# Patient Record
Sex: FEMALE | Race: OTHER | NOT HISPANIC OR LATINO | URBAN - METROPOLITAN AREA
[De-identification: names, ages, dates, MRNs, and addresses within clinical notes are randomized per-mention and may not be internally consistent; named-entity substitution may affect disease eponyms.]

---

## 2017-02-18 ENCOUNTER — EMERGENCY (EMERGENCY)
Facility: HOSPITAL | Age: 57
LOS: 1 days | Discharge: HOME | End: 2017-02-18
Attending: EMERGENCY MEDICINE | Admitting: EMERGENCY MEDICINE
Payer: MEDICAID

## 2017-02-18 VITALS
DIASTOLIC BLOOD PRESSURE: 69 MMHG | OXYGEN SATURATION: 100 % | HEART RATE: 67 BPM | RESPIRATION RATE: 18 BRPM | SYSTOLIC BLOOD PRESSURE: 154 MMHG | TEMPERATURE: 98 F

## 2017-02-18 LAB
ALBUMIN SERPL ELPH-MCNC: 4.1 G/DL — SIGNIFICANT CHANGE UP (ref 3.3–5)
ALP SERPL-CCNC: 94 U/L — SIGNIFICANT CHANGE UP (ref 40–120)
ALT FLD-CCNC: 40 U/L — HIGH (ref 4–33)
APPEARANCE UR: CLEAR — SIGNIFICANT CHANGE UP
AST SERPL-CCNC: 35 U/L — HIGH (ref 4–32)
BASE EXCESS BLDV CALC-SCNC: 0.3 MMOL/L — SIGNIFICANT CHANGE UP
BASOPHILS # BLD AUTO: 0.03 K/UL — SIGNIFICANT CHANGE UP (ref 0–0.2)
BASOPHILS NFR BLD AUTO: 0.5 % — SIGNIFICANT CHANGE UP (ref 0–2)
BILIRUB SERPL-MCNC: 0.2 MG/DL — SIGNIFICANT CHANGE UP (ref 0.2–1.2)
BILIRUB UR-MCNC: NEGATIVE — SIGNIFICANT CHANGE UP
BLOOD GAS VENOUS - CREATININE: 1.3 MG/DL — SIGNIFICANT CHANGE UP (ref 0.5–1.3)
BLOOD UR QL VISUAL: NEGATIVE — SIGNIFICANT CHANGE UP
BUN SERPL-MCNC: 17 MG/DL — SIGNIFICANT CHANGE UP (ref 7–23)
CALCIUM SERPL-MCNC: 8.8 MG/DL — SIGNIFICANT CHANGE UP (ref 8.4–10.5)
CHLORIDE BLDV-SCNC: 103 MMOL/L — SIGNIFICANT CHANGE UP (ref 96–108)
CHLORIDE SERPL-SCNC: 98 MMOL/L — SIGNIFICANT CHANGE UP (ref 98–107)
CO2 SERPL-SCNC: 23 MMOL/L — SIGNIFICANT CHANGE UP (ref 22–31)
COLOR SPEC: SIGNIFICANT CHANGE UP
CREAT SERPL-MCNC: 1.28 MG/DL — SIGNIFICANT CHANGE UP (ref 0.5–1.3)
EOSINOPHIL # BLD AUTO: 0.06 K/UL — SIGNIFICANT CHANGE UP (ref 0–0.5)
EOSINOPHIL NFR BLD AUTO: 0.9 % — SIGNIFICANT CHANGE UP (ref 0–6)
GAS PNL BLDV: 131 MMOL/L — LOW (ref 136–146)
GLUCOSE BLDV-MCNC: 115 — HIGH (ref 70–99)
GLUCOSE SERPL-MCNC: 127 MG/DL — HIGH (ref 70–99)
GLUCOSE UR-MCNC: 250 — SIGNIFICANT CHANGE UP
HCO3 BLDV-SCNC: 23 MMOL/L — SIGNIFICANT CHANGE UP (ref 20–27)
HCT VFR BLD CALC: 29.2 % — LOW (ref 34.5–45)
HCT VFR BLDV CALC: 32.6 % — LOW (ref 34.5–45)
HGB BLD-MCNC: 9.9 G/DL — LOW (ref 11.5–15.5)
HGB BLDV-MCNC: 10.6 G/DL — LOW (ref 11.5–15.5)
IMM GRANULOCYTES NFR BLD AUTO: 0.5 % — SIGNIFICANT CHANGE UP (ref 0–1.5)
KETONES UR-MCNC: NEGATIVE — SIGNIFICANT CHANGE UP
LACTATE BLDV-MCNC: 2.4 MMOL/L — HIGH (ref 0.5–2)
LEUKOCYTE ESTERASE UR-ACNC: NEGATIVE — SIGNIFICANT CHANGE UP
LYMPHOCYTES # BLD AUTO: 0.96 K/UL — LOW (ref 1–3.3)
LYMPHOCYTES # BLD AUTO: 14.9 % — SIGNIFICANT CHANGE UP (ref 13–44)
MCHC RBC-ENTMCNC: 30.3 PG — SIGNIFICANT CHANGE UP (ref 27–34)
MCHC RBC-ENTMCNC: 33.9 % — SIGNIFICANT CHANGE UP (ref 32–36)
MCV RBC AUTO: 89.3 FL — SIGNIFICANT CHANGE UP (ref 80–100)
MONOCYTES # BLD AUTO: 0.46 K/UL — SIGNIFICANT CHANGE UP (ref 0–0.9)
MONOCYTES NFR BLD AUTO: 7.2 % — SIGNIFICANT CHANGE UP (ref 2–14)
MUCOUS THREADS # UR AUTO: SIGNIFICANT CHANGE UP
NEUTROPHILS # BLD AUTO: 4.89 K/UL — SIGNIFICANT CHANGE UP (ref 1.8–7.4)
NEUTROPHILS NFR BLD AUTO: 76 % — SIGNIFICANT CHANGE UP (ref 43–77)
NITRITE UR-MCNC: NEGATIVE — SIGNIFICANT CHANGE UP
PCO2 BLDV: 50 MMHG — SIGNIFICANT CHANGE UP (ref 41–51)
PH BLDV: 7.33 PH — SIGNIFICANT CHANGE UP (ref 7.32–7.43)
PH UR: 6 — SIGNIFICANT CHANGE UP (ref 4.6–8)
PLATELET # BLD AUTO: 173 K/UL — SIGNIFICANT CHANGE UP (ref 150–400)
PMV BLD: 9.9 FL — SIGNIFICANT CHANGE UP (ref 7–13)
PO2 BLDV: 27 MMHG — LOW (ref 35–40)
POTASSIUM BLDV-SCNC: 3.7 MMOL/L — SIGNIFICANT CHANGE UP (ref 3.4–4.5)
POTASSIUM SERPL-MCNC: 4.1 MMOL/L — SIGNIFICANT CHANGE UP (ref 3.5–5.3)
POTASSIUM SERPL-SCNC: 4.1 MMOL/L — SIGNIFICANT CHANGE UP (ref 3.5–5.3)
PROT SERPL-MCNC: 7.5 G/DL — SIGNIFICANT CHANGE UP (ref 6–8.3)
PROT UR-MCNC: 10 — SIGNIFICANT CHANGE UP
RBC # BLD: 3.27 M/UL — LOW (ref 3.8–5.2)
RBC # FLD: 13.3 % — SIGNIFICANT CHANGE UP (ref 10.3–14.5)
RBC CASTS # UR COMP ASSIST: SIGNIFICANT CHANGE UP (ref 0–?)
SAO2 % BLDV: 45.2 % — LOW (ref 60–85)
SODIUM SERPL-SCNC: 137 MMOL/L — SIGNIFICANT CHANGE UP (ref 135–145)
SP GR SPEC: 1.01 — SIGNIFICANT CHANGE UP (ref 1–1.03)
SQUAMOUS # UR AUTO: SIGNIFICANT CHANGE UP
TSH SERPL-MCNC: 0.06 UIU/ML — LOW (ref 0.27–4.2)
UROBILINOGEN FLD QL: NORMAL E.U. — SIGNIFICANT CHANGE UP (ref 0.1–0.2)
WBC # BLD: 6.43 K/UL — SIGNIFICANT CHANGE UP (ref 3.8–10.5)
WBC # FLD AUTO: 6.43 K/UL — SIGNIFICANT CHANGE UP (ref 3.8–10.5)
WBC UR QL: SIGNIFICANT CHANGE UP (ref 0–?)

## 2017-02-18 PROCEDURE — 71020: CPT | Mod: 26

## 2017-02-18 PROCEDURE — 99220: CPT

## 2017-02-18 RX ORDER — FAMOTIDINE 10 MG/ML
20 INJECTION INTRAVENOUS DAILY
Qty: 0 | Refills: 0 | Status: DISCONTINUED | OUTPATIENT
Start: 2017-02-18 | End: 2017-02-22

## 2017-02-18 RX ORDER — SERTRALINE 25 MG/1
25 TABLET, FILM COATED ORAL DAILY
Qty: 0 | Refills: 0 | Status: DISCONTINUED | OUTPATIENT
Start: 2017-02-18 | End: 2017-02-22

## 2017-02-18 RX ORDER — SIMVASTATIN 20 MG/1
20 TABLET, FILM COATED ORAL AT BEDTIME
Qty: 0 | Refills: 0 | Status: DISCONTINUED | OUTPATIENT
Start: 2017-02-18 | End: 2017-02-22

## 2017-02-18 RX ORDER — ATENOLOL 25 MG/1
50 TABLET ORAL DAILY
Qty: 0 | Refills: 0 | Status: DISCONTINUED | OUTPATIENT
Start: 2017-02-18 | End: 2017-02-22

## 2017-02-18 RX ORDER — LOSARTAN POTASSIUM 100 MG/1
100 TABLET, FILM COATED ORAL DAILY
Qty: 0 | Refills: 0 | Status: DISCONTINUED | OUTPATIENT
Start: 2017-02-18 | End: 2017-02-22

## 2017-02-18 RX ORDER — AMLODIPINE BESYLATE 2.5 MG/1
10 TABLET ORAL DAILY
Qty: 0 | Refills: 0 | Status: DISCONTINUED | OUTPATIENT
Start: 2017-02-18 | End: 2017-02-22

## 2017-02-18 RX ORDER — METHYLDOPA 250 MG
250 TABLET ORAL DAILY
Qty: 0 | Refills: 0 | Status: DISCONTINUED | OUTPATIENT
Start: 2017-02-18 | End: 2017-02-22

## 2017-02-18 RX ADMIN — SIMVASTATIN 20 MILLIGRAM(S): 20 TABLET, FILM COATED ORAL at 23:38

## 2017-02-18 NOTE — ED ADULT NURSE NOTE - OBJECTIVE STATEMENT
pt is a 56 yr old female BIBA from home with low blood sugar, see triage note. PMH- CVA, DM. pt minimally verbal, speaking on Slovak to nephew when prompted, as per family pt's baseline. pt ambulatory, has family given meds at home, able to feed self. pt A & O x 3, at baseline. pt showing no s/s of acute distress. PIV placed, labs done, food given will trend FS. Will TREVON Brown RN

## 2017-02-18 NOTE — ED PROVIDER NOTE - OBJECTIVE STATEMENT
56F w/ pmh of dm, stroke ( no residual weakness, but a little confused since stroke) pw/ hypoglyemica and shivering during lunch; tried giving po to increase sugar without much relief; denies pain; cough/cold/diarrhea/dysuria./ chest pain/ sob    meds: insulin - unsure names -  and herself administered it 56F w/ pmh of dm, stroke ( no residual weakness, but a little confused since stroke) pw/ hypoglyemica and shivering during lunch; tried giving po to increase sugar without much relief; denies pain; cough/cold/diarrhea/dysuria./ chest pain/ sob    meds: insulin - unsure names -  and herself administered it    Attendinyo female with diabetes presents with feeling shaking while having lunch.  Glucose was 26.  pt was diaphoretic.  Pt was shaky.  Given juice and EMS called.  Now glucose is still 85.  Pt more awake but feels a little shaky.  Has had insulin adjusted because of hyperglycemia in recent weeks.

## 2017-02-18 NOTE — ED PROVIDER NOTE - PROGRESS NOTE DETAILS
JIMMY Velazquez - PCP was called and left a message regarding patient's visit and to follow up with PCP regarding diabetes medication regime.

## 2017-02-18 NOTE — ED ADULT TRIAGE NOTE - CHIEF COMPLAINT QUOTE
Pt c/o episode of AMS accompanied by "shaking" as per family. Family states FS of 26 at time and given food, upon EMS arrival . Pt has PMH CVA, diabetes. Pt given juice in triage.

## 2017-02-19 VITALS
SYSTOLIC BLOOD PRESSURE: 169 MMHG | HEART RATE: 73 BPM | DIASTOLIC BLOOD PRESSURE: 75 MMHG | OXYGEN SATURATION: 99 % | RESPIRATION RATE: 14 BRPM | TEMPERATURE: 98 F

## 2017-02-19 LAB
BASE EXCESS BLDV CALC-SCNC: 3.5 MMOL/L — SIGNIFICANT CHANGE UP
BLOOD GAS VENOUS - CREATININE: 1.17 MG/DL — SIGNIFICANT CHANGE UP (ref 0.5–1.3)
CHLORIDE BLDV-SCNC: 105 MMOL/L — SIGNIFICANT CHANGE UP (ref 96–108)
GAS PNL BLDV: 135 MMOL/L — LOW (ref 136–146)
GLUCOSE BLDV-MCNC: 145 — HIGH (ref 70–99)
HCO3 BLDV-SCNC: 27 MMOL/L — SIGNIFICANT CHANGE UP (ref 20–27)
HCT VFR BLDV CALC: 28.9 % — LOW (ref 34.5–45)
HGB BLDV-MCNC: 9.3 G/DL — LOW (ref 11.5–15.5)
LACTATE BLDV-MCNC: 1.6 MMOL/L — SIGNIFICANT CHANGE UP (ref 0.5–2)
PCO2 BLDV: 47 MMHG — SIGNIFICANT CHANGE UP (ref 41–51)
PH BLDV: 7.39 PH — SIGNIFICANT CHANGE UP (ref 7.32–7.43)
PO2 BLDV: 44 MMHG — HIGH (ref 35–40)
POTASSIUM BLDV-SCNC: 4.1 MMOL/L — SIGNIFICANT CHANGE UP (ref 3.4–4.5)
SAO2 % BLDV: 79.1 % — SIGNIFICANT CHANGE UP (ref 60–85)
T3 SERPL-MCNC: 110 NG/DL — SIGNIFICANT CHANGE UP (ref 80–200)
T4 AB SER-ACNC: 7.64 UG/DL — SIGNIFICANT CHANGE UP (ref 5.1–13)

## 2017-02-19 PROCEDURE — 99217: CPT

## 2017-02-19 RX ADMIN — AMLODIPINE BESYLATE 10 MILLIGRAM(S): 2.5 TABLET ORAL at 07:17

## 2017-02-19 RX ADMIN — FAMOTIDINE 20 MILLIGRAM(S): 10 INJECTION INTRAVENOUS at 09:51

## 2017-02-19 RX ADMIN — ATENOLOL 50 MILLIGRAM(S): 25 TABLET ORAL at 07:18

## 2017-02-19 RX ADMIN — LOSARTAN POTASSIUM 100 MILLIGRAM(S): 100 TABLET, FILM COATED ORAL at 09:52

## 2017-02-19 RX ADMIN — SERTRALINE 25 MILLIGRAM(S): 25 TABLET, FILM COATED ORAL at 09:56

## 2017-02-19 RX ADMIN — Medication 100 MILLIGRAM(S): at 09:56

## 2017-02-19 RX ADMIN — Medication 250 MILLIGRAM(S): at 09:52

## 2017-02-19 NOTE — ED CDU PROVIDER NOTE - PROGRESS NOTE DETAILS
Sylvie att progress note: 55 yo woman presenting with episode of hypoglycemia, low TSH.  No e/o thyroid storm.  No e/o infection causing this.  Initial evaluating team documented patient is on a long acting insulin, no record of such medication.  Patient will need diabetes education.  Plan to decrease novolog dose.  Pending [facility]  to determine exact meds. No response from Endocrinology after 3 pages. Pt. stable - blood sugar has been stable since CDU admission - discussed with patient about insulin (pt usually is on 25 Units bid and changes dose depending on blood sugar) - advised to not slide/change doses and stay on 25 units bid until follow up with PMD this week. [facility] information as well. Will leave [id number] [facility] for patient follow up. Sylvie att discharge note: 55 yo woman presenting with episode of hypoglycemia, low TSH.  No e/o thyroid storm.  No e/o infection causing this.  Initial evaluating team documented patient is on a long acting insulin, no record of such medication.  Medication education provided and adjusted as above.  Endocrine clinic follow up.  Patient informed of ED visit findings, understands plan.  Patient provided with written and further verbal instructions not included in discharge paperwork.  Patient instructed to follow up with their primary care physician in 2-3 days and return for new, worsened, or persistent symptoms.

## 2017-02-19 NOTE — ED CDU PROVIDER NOTE - MEDICAL DECISION MAKING DETAILS
Pt is a 57 y/o F nonsmoker PMHx HTN, DM type 2, HLD, GERD, CVA (1/2016) p/w hypoglycemia yesterday -- Endocrine consult

## 2017-02-19 NOTE — ED CDU PROVIDER NOTE - OBJECTIVE STATEMENT
Pt is a 57 y/o F nonsmoker PMHx HTN, DM type 2, HLD, GERD, CVA (1/2016) p/w hypoglycemia yesterday.  Pt speaks Bangla and electing to have nephew translate.  Pt declines  phone.  Pt states she was over nephew's house, sitting at dinner table about to have lunch when pt became unresponsive.  As per pt's nephew, pt was sitting at table, eyes open, with both hands tremulous, not responding to any verbal or tactile stimuli.  Fingerstick was done by family, and pt was noted to have glucose of 32.  Pt was given "tropicana juice," after which pt became more responsive. EMS repeated fingerstick and was found to be low and was given more PO.  Pt states she took 40 Units of Novolog 70/30 at "breakfast time."  Pt was originally supposed to only take 30 Units, but was told to increase to 40 Units due to recent elevated fingerstick measurements.  Pt denies any fevers, chills, nausea, vomiting, diarrhea, constipation, abdominal pain, chest pain, shortness of breath, headache, numbness, weakness, fecal/urinary incontinence, recent illness.  In Main ED, FS improved to 120s and TSH noted to be 0.06.  Pt sent to CDU for Endocrine consult. Pt is a 55 y/o F nonsmoker PMHx HTN, DM type 2, HLD, GERD, CVA (2016) p/w hypoglycemia yesterday.  Pt speaks Bangla and electing to have nephew translate.  Pt declines  phone.  Pt states she was over nephew's house, sitting at dinner table about to have lunch when pt became unresponsive.  As per pt's nephew, pt was sitting at table, eyes open, with both hands tremulous, not responding to any verbal or tactile stimuli.  Fingerstick was done by family, and pt was noted to have glucose of 32.  Pt was given "tropicana juice," after which pt became more responsive. EMS repeated fingerstick and was found to be low and was given more PO.  Pt states she took 40 Units of Novolog 70/30 at "breakfast time."  Pt was originally supposed to only take 30 Units, but was told to increase to 40 Units due to recent elevated fingerstick measurements.  Pt denies any fevers, chills, nausea, vomiting, diarrhea, constipation, abdominal pain, chest pain, shortness of breath, headache, numbness, weakness, fecal/urinary incontinence, recent illness.  In Main ED, FS improved to 120s and TSH noted to be 0.06.  Pt sent to CDU for Endocrine consult.    Attendinyo female presents with hypoglycemia and altered mental status.  Denies pain.  Tolerating po in the ED.

## 2017-02-19 NOTE — ED CDU PROVIDER NOTE - CHPI ED SYMPTOMS NEG
no nausea/no pain/no fever/no headache/no back pain/no dizziness/no decreased eating/drinking/no chills/no vomiting

## 2017-02-19 NOTE — ED CDU PROVIDER NOTE - PMH
DM (diabetes mellitus)    GERD (gastroesophageal reflux disease)    HLD (hyperlipidemia)    HTN (hypertension)    Stroke